# Patient Record
Sex: MALE | Race: WHITE | NOT HISPANIC OR LATINO | Employment: OTHER | URBAN - NONMETROPOLITAN AREA
[De-identification: names, ages, dates, MRNs, and addresses within clinical notes are randomized per-mention and may not be internally consistent; named-entity substitution may affect disease eponyms.]

---

## 2023-02-04 ENCOUNTER — HOSPITAL ENCOUNTER (EMERGENCY)
Facility: HOSPITAL | Age: 88
Discharge: HOME | End: 2023-02-04
Attending: NURSE PRACTITIONER | Admitting: NURSE PRACTITIONER
Payer: MEDICARE

## 2023-02-04 VITALS
RESPIRATION RATE: 18 BRPM | SYSTOLIC BLOOD PRESSURE: 150 MMHG | DIASTOLIC BLOOD PRESSURE: 87 MMHG | HEART RATE: 67 BPM | OXYGEN SATURATION: 96 % | TEMPERATURE: 98.2 F

## 2023-02-04 DIAGNOSIS — R04.0 ANTERIOR EPISTAXIS: ICD-10-CM

## 2023-02-04 LAB
ANION GAP SERPL CALCULATED.3IONS-SCNC: 8 MMOL/L (ref 7–15)
BASOPHILS # BLD AUTO: 0 10E3/UL (ref 0–0.2)
BASOPHILS NFR BLD AUTO: 0 %
BUN SERPL-MCNC: 18.7 MG/DL (ref 8–23)
CALCIUM SERPL-MCNC: 8.9 MG/DL (ref 8.8–10.2)
CHLORIDE SERPL-SCNC: 105 MMOL/L (ref 98–107)
CREAT SERPL-MCNC: 0.79 MG/DL (ref 0.67–1.17)
DEPRECATED HCO3 PLAS-SCNC: 28 MMOL/L (ref 22–29)
EOSINOPHIL # BLD AUTO: 0.2 10E3/UL (ref 0–0.7)
EOSINOPHIL NFR BLD AUTO: 3 %
ERYTHROCYTE [DISTWIDTH] IN BLOOD BY AUTOMATED COUNT: 18.9 % (ref 10–15)
GFR SERPL CREATININE-BSD FRML MDRD: 85 ML/MIN/1.73M2
GLUCOSE SERPL-MCNC: 108 MG/DL (ref 70–99)
HCT VFR BLD AUTO: 36.2 % (ref 40–53)
HGB BLD-MCNC: 11.1 G/DL (ref 13.3–17.7)
HOLD SPECIMEN: NORMAL
HOLD SPECIMEN: NORMAL
IMM GRANULOCYTES # BLD: 0 10E3/UL
IMM GRANULOCYTES NFR BLD: 0 %
INR PPP: 2.38 (ref 0.85–1.15)
LYMPHOCYTES # BLD AUTO: 1.4 10E3/UL (ref 0.8–5.3)
LYMPHOCYTES NFR BLD AUTO: 21 %
MCH RBC QN AUTO: 24.2 PG (ref 26.5–33)
MCHC RBC AUTO-ENTMCNC: 30.7 G/DL (ref 31.5–36.5)
MCV RBC AUTO: 79 FL (ref 78–100)
MONOCYTES # BLD AUTO: 0.9 10E3/UL (ref 0–1.3)
MONOCYTES NFR BLD AUTO: 14 %
NEUTROPHILS # BLD AUTO: 4 10E3/UL (ref 1.6–8.3)
NEUTROPHILS NFR BLD AUTO: 62 %
NRBC # BLD AUTO: 0 10E3/UL
NRBC BLD AUTO-RTO: 0 /100
PLATELET # BLD AUTO: 241 10E3/UL (ref 150–450)
POTASSIUM SERPL-SCNC: 4.1 MMOL/L (ref 3.4–5.3)
RBC # BLD AUTO: 4.58 10E6/UL (ref 4.4–5.9)
SODIUM SERPL-SCNC: 141 MMOL/L (ref 136–145)
WBC # BLD AUTO: 6.5 10E3/UL (ref 4–11)

## 2023-02-04 PROCEDURE — 80048 BASIC METABOLIC PNL TOTAL CA: CPT | Performed by: NURSE PRACTITIONER

## 2023-02-04 PROCEDURE — 30901 CONTROL OF NOSEBLEED: CPT | Mod: LT | Performed by: NURSE PRACTITIONER

## 2023-02-04 PROCEDURE — 99284 EMERGENCY DEPT VISIT MOD MDM: CPT | Mod: 25

## 2023-02-04 PROCEDURE — 250N000009 HC RX 250: Performed by: NURSE PRACTITIONER

## 2023-02-04 PROCEDURE — 36415 COLL VENOUS BLD VENIPUNCTURE: CPT | Performed by: NURSE PRACTITIONER

## 2023-02-04 PROCEDURE — 99283 EMERGENCY DEPT VISIT LOW MDM: CPT | Mod: 25 | Performed by: NURSE PRACTITIONER

## 2023-02-04 PROCEDURE — 85610 PROTHROMBIN TIME: CPT | Performed by: NURSE PRACTITIONER

## 2023-02-04 PROCEDURE — 85025 COMPLETE CBC W/AUTO DIFF WBC: CPT | Performed by: NURSE PRACTITIONER

## 2023-02-04 PROCEDURE — 30901 CONTROL OF NOSEBLEED: CPT | Mod: LT

## 2023-02-04 RX ORDER — LIDOCAINE HYDROCHLORIDE AND EPINEPHRINE 10; 10 MG/ML; UG/ML
1 INJECTION, SOLUTION INFILTRATION; PERINEURAL ONCE
Status: COMPLETED | OUTPATIENT
Start: 2023-02-04 | End: 2023-02-04

## 2023-02-04 RX ADMIN — LIDOCAINE HYDROCHLORIDE,EPINEPHRINE BITARTRATE 1 ML: 10; .01 INJECTION, SOLUTION INFILTRATION; PERINEURAL at 22:22

## 2023-02-04 ASSESSMENT — ACTIVITIES OF DAILY LIVING (ADL): ADLS_ACUITY_SCORE: 35

## 2023-02-05 NOTE — ED PROVIDER NOTES
EMERGENCY MEDICINE NOTE - ADARSH ADAM, CNP    ID:   Jomar Lei    CC:  Chief Complaint   Patient presents with     Epistaxis        MEANS OF ARRIVAL:  ambulance    PCP:   No Ref-Primary, Physician    SUBJECTIVE:   HPI:   Jomar Lei is a 88 year old individual with history of hypertension, atrial fibrillation on anticoagulation with warfarin, comes in today for epistaxis from the left nostril.   Patient has spontaneous epistaxis from left nostril that started today.  Has been seen twice at [facility] in the past month for the same reason.  Has not followed up with ENT for this.  Patient arrives with nasal clamp in place.  Patient denies any sensation of blood going down the posterior pharynx.  Denies any nausea.  Denies any dizziness or lightheadedness.  Denies any trauma that precipitated the bleeding.  Did not take Coumadin today.  Did not take any night blood pressure medications.    Review of Systems:  Significant positives/negatives were reviewed and mentioned in HPI.  All remaining body systems are negative or non-contributory.    I have reviewed the PMH, Meds, Allergies, and SH, including:  ALLERGIES:  No Known Allergies    CURRENT MEDICATIONS:  No current outpatient medications on file.        PMH:  There is no problem list on file for this patient.    History reviewed. No pertinent surgical history.  Social History     Tobacco Use     Smoking status: Never     Smokeless tobacco: Never   Substance Use Topics     Alcohol use: Not Currently     Drug use: Not Currently       OBJECTIVE:     Vitals:    02/04/23 2056 02/04/23 2115 02/04/23 2130 02/04/23 2200   BP: 151/91 162/92 162/98 (!) 134/101   Pulse:  71 73 80   Resp:       TempSrc:       SpO2: 97% 98% 98% 97%     There is no height or weight on file to calculate BMI.  GENERAL APPEARANCE:  The patient is a 88 year old well-developed, well-nourished individual in no acute distress that appears as stated age.  NT:  Oropharynx is clear.   Uvula is midline and without swelling.  Mouth revealed no lesions.  Voice is clear and without muffling.  No bleeding present in right nostril.  Left nostril with bleeding coming out of the nostril.  Unable to locate source of bleed.  NECK:  Supple.  Trachea is midline.    LUNGS:  Breathing is easy.  Breath sounds are equal and clear bilaterally.  No wheezes, rhonchi, or rales.  HEART:  Irregularly irregular rate and rhythm.  No murmurs, gallops, or rubs.  NEUROLOGIC:  No focal sensory or motor deficits are noted.   PSYCHIATRIC:  The patient is awake, alert, and oriented x4.  Recent and remote memory is intact.  Appropriate mood and affect.  Calm and cooperative with history and physical exam.  SKIN:  Warm, dry, and well perfused.  Good turgor.  No lesions, nodules, or rashes are noted.  No bruising noted.      Comment: Discrepancies between my note and notes on behalf of the nursing team or other care providers are secondary to my findings reflecting my physical examination and questioning of the patient.  Any conflicting information provided is not in line with my examination of the patient.    LAB:     Recent Results (from the past 24 hour(s))   INR    Collection Time: 02/04/23  9:11 PM   Result Value Ref Range    INR 2.38 (H) 0.85 - 1.15   Basic metabolic panel    Collection Time: 02/04/23  9:11 PM   Result Value Ref Range    Sodium 141 136 - 145 mmol/L    Potassium 4.1 3.4 - 5.3 mmol/L    Chloride 105 98 - 107 mmol/L    Carbon Dioxide (CO2) 28 22 - 29 mmol/L    Anion Gap 8 7 - 15 mmol/L    Urea Nitrogen 18.7 8.0 - 23.0 mg/dL    Creatinine 0.79 0.67 - 1.17 mg/dL    Calcium 8.9 8.8 - 10.2 mg/dL    Glucose 108 (H) 70 - 99 mg/dL    GFR Estimate 85 >60 mL/min/1.73m2   CBC with platelets and differential    Collection Time: 02/04/23  9:11 PM   Result Value Ref Range    WBC Count 6.5 4.0 - 11.0 10e3/uL    RBC Count 4.58 4.40 - 5.90 10e6/uL    Hemoglobin 11.1 (L) 13.3 - 17.7 g/dL    Hematocrit 36.2 (L) 40.0 - 53.0 %     MCV 79 78 - 100 fL    MCH 24.2 (L) 26.5 - 33.0 pg    MCHC 30.7 (L) 31.5 - 36.5 g/dL    RDW 18.9 (H) 10.0 - 15.0 %    Platelet Count 241 150 - 450 10e3/uL    % Neutrophils 62 %    % Lymphocytes 21 %    % Monocytes 14 %    % Eosinophils 3 %    % Basophils 0 %    % Immature Granulocytes 0 %    NRBCs per 100 WBC 0 <1 /100    Absolute Neutrophils 4.0 1.6 - 8.3 10e3/uL    Absolute Lymphocytes 1.4 0.8 - 5.3 10e3/uL    Absolute Monocytes 0.9 0.0 - 1.3 10e3/uL    Absolute Eosinophils 0.2 0.0 - 0.7 10e3/uL    Absolute Basophils 0.0 0.0 - 0.2 10e3/uL    Absolute Immature Granulocytes 0.0 <=0.4 10e3/uL    Absolute NRBCs 0.0 10e3/uL       PROCEDURES:   [facility]    -Epistaxis Mgmt    Date/Time: 2/4/2023 10:26 PM  Performed by: Panda San APRN CNP  Authorized by: Panda San APRN CNP     Emergent condition/consent implied      ANESTHESIA (see MAR for exact dosages)     Anesthesia method:  Topical application    Topical anesthetic:  Epinephrine      PROCEDURE DETAILS     Treatment site:  L anterior    Treatment method:  Merocel sponge    Treatment complexity:  Limited    Treatment episode: recurrence of recent bleed        POST PROCEDURE     Assessment:  Bleeding stopped    PROCEDURE    Patient Tolerance:  Patient tolerated the procedure well with no immediate complications    ED COURSE/ MDM/ ASSESSMENT/ PLAN:   Diagnostic Testing:  Diagnostic testing was conducted.  Labs show WBC of 6.5 with hemoglobin 11.1.  Electrolytes and renal functions benign.  INR 2.38.    Assessment:   Patient presents to the ER today for left-sided epistaxis.  Upon arrival, vitals signs are show blood pressure 162/92 with a pulse of 71.  Respirations 18 with oxygenation of 98% on room air.  Examination was completed.  The patient is alert and oriented.  Does have bleeding coming from left nostril.  No posterior drainage present.  Lung sounds are clear.    Potential diagnosis which have been considered and evaluated include  anterior epistaxis, posterior epistaxis, as well as others. Many of these have been excluded using the various modalities and assessment as noted on the chart. At the present time, the diagnosis given seems to be the most likely left nostril anterior epistaxis.    ED Course/MDM/Plan:   Patient comes in with left-sided epistaxis.  Patient states this is the third time he has been seen for this in the past month (other visits were at [facility] in [place]).  Patient states had spontaneous nosebleed today and will quit.  Patient is on warfarin for atrial fibrillation.  Has not taken the dose today.  Nasal clamp was applied prior to arrival via EMS.   Physical examination does show no posterior bleeding down the throat.  With nasal clamp in place, epistaxis is controlled.  When taken off, immediately has bleeding in the left nostril.  Did apply lidocaine with epinephrine and pressure with no improvement.  With this having no effect on the bleeding did place Merocel pad in the left nostril.  Did have control of epistaxis after this.  No posterior pharynx blood noted.  Patient feeling well.  Will discharge patient home to continue current medications.  We will have patient hold his Coumadin.  Follow-up with PCP or urgent care on Monday for INR check and reevaluation with removal of nasal packing.  Referral for ENT placed as this is third time with epistaxis this month.  Patient denies any questions or concerns at this time.  Family with patient and they deny any questions or concerns.  Advised patient to return to ER if any new bleeding or concerns arise in the interim.  Patient discharged home.     DIAGNOSIS:   (R04.0) Anterior epistaxis      Critical Care Time: None    Impression and plan discussed with patient. Questions answered, concerns addressed, indications for urgent re-evaluation reviewed, and  given. Patient/Parent/Caregiver agree with treatment plan and have no further questions at this time.  ELIJAH  provided at discharge.    This note was created by the Dragon Voice Dictation System. Inadvertent typographical errors, due to software recognition problems, may still exist.      Panda ADAM, CNP  [facility]  EMERGENCY DEPARTMENT  [address] [place] [address] [id number] [phone]       Panda San APRN CNP  02/04/23 3431

## 2023-02-05 NOTE — ED TRIAGE NOTES
Patient presents with complaints of a nose bleed. Per EMS the daughter states he has been seen twice in the last month for this. Per the patient he doctors at [facility] in [place]. Patient is from [place] so he doesn't ever doctor here. He states he is on a blood thinner but doesn't know what meds he takes for blood pressure.

## 2023-02-05 NOTE — DISCHARGE INSTRUCTIONS
Not take your Coumadin today or tomorrow.    Leave nasal packing in place.  Do not mess with this.    Follow-up with your primary care provider on Monday, 2/6/2023 for INR check and removal of packing.  Make appointment with ENT for evaluation.       Follow-up with your primary care provider for reevaluation.  Contact your primary care provider if you have any questions or concerns.  Do not hesitate to return to the ER if any new or worsening symptoms.     Please read the attached instructions (if any).  They highlight more specific treatments and interventions for you at home.              Thank you for letting me participate in your care and wish you a fast and uneventful recovery,    Panda ADAM, CNP    Do not hesitate to contact me with questions or concerns.  [email or web address]  [email or web address]